# Patient Record
Sex: FEMALE | URBAN - NONMETROPOLITAN AREA
[De-identification: names, ages, dates, MRNs, and addresses within clinical notes are randomized per-mention and may not be internally consistent; named-entity substitution may affect disease eponyms.]

---

## 2018-07-16 ENCOUNTER — TELEPHONE (OUTPATIENT)
Dept: NEUROSURGERY | Age: 78
End: 2018-07-16

## 2018-07-16 NOTE — TELEPHONE ENCOUNTER
Neurosurgical pre apt questionnaire     Referring physician? GOSIA Fonseca    Who is completing questionnaire? Patient     Has the pt had any previous spinal/brain surgeries? NO    If yes, Where was the surgery preformed? What was the surgery? Who was the surgeon? When was this surgery? Why is the pt not following up with previous surgeon? Is this a second opinion? Was a MRI preformed? YES    If not, Is there any reason a MRI cannot be performed? Is there metal anywhere in the body? If yes,  Where was the MRI preformed? [facility]   Patient agreed to bring disk? YES   What part of the body? L Spine   When was it preformed? 6/13/18      Note:If  was not the facility that performed the study,    the disc will need to be brought appoint. Physical Therapy? YES - patient states is has helped. If yes, where was PT completed? Dr. Lacey Perry in [place]   What is the duration of therapy? Two days a week     Pain Management? NO   If yes, where was pain mgt therapy? Is the patient still under contract with pain mgt? Who is the pain mgt physician? Is the pt currently taking anything for pain control? Was prescribed Gabapentin but found she was allergic. Has not been prescribed anything for pain since. Employment Status ? Retired   What type of employment? Has the patient missed work due to pain? If unemployed, how long? Are you on disability? NO      Symptoms? Uncomfortable to lay on left side. Left leg pain and numb from knee to ankle.

## 2018-08-02 ENCOUNTER — OFFICE VISIT (OUTPATIENT)
Dept: NEUROSURGERY | Age: 78
End: 2018-08-02
Payer: MEDICARE

## 2018-08-02 VITALS
DIASTOLIC BLOOD PRESSURE: 78 MMHG | OXYGEN SATURATION: 99 % | HEART RATE: 74 BPM | WEIGHT: 153 LBS | SYSTOLIC BLOOD PRESSURE: 142 MMHG | HEIGHT: 65 IN | BODY MASS INDEX: 25.49 KG/M2

## 2018-08-02 DIAGNOSIS — M51.36 DDD (DEGENERATIVE DISC DISEASE), LUMBAR: ICD-10-CM

## 2018-08-02 DIAGNOSIS — R20.0 LEFT LEG NUMBNESS: ICD-10-CM

## 2018-08-02 DIAGNOSIS — M48.061 SPINAL STENOSIS OF LUMBAR REGION WITHOUT NEUROGENIC CLAUDICATION: ICD-10-CM

## 2018-08-02 DIAGNOSIS — M79.652 LEFT THIGH PAIN: Primary | ICD-10-CM

## 2018-08-02 DIAGNOSIS — M51.37 DDD (DEGENERATIVE DISC DISEASE), LUMBOSACRAL: ICD-10-CM

## 2018-08-02 PROCEDURE — 99204 OFFICE O/P NEW MOD 45 MIN: CPT | Performed by: NURSE PRACTITIONER

## 2018-08-02 RX ORDER — ZOLPIDEM TARTRATE 10 MG/1
TABLET ORAL
COMMUNITY

## 2018-08-02 RX ORDER — MONTELUKAST SODIUM 10 MG/1
TABLET ORAL
COMMUNITY

## 2018-08-02 RX ORDER — PRAVASTATIN SODIUM 20 MG
TABLET ORAL
Refills: 1 | COMMUNITY
Start: 2018-05-14

## 2018-08-02 RX ORDER — BENZONATATE 100 MG/1
CAPSULE ORAL
Refills: 0 | COMMUNITY
Start: 2018-05-10

## 2018-08-02 RX ORDER — LEVOTHYROXINE SODIUM 0.1 MG/1
TABLET ORAL
COMMUNITY

## 2018-08-02 ASSESSMENT — ENCOUNTER SYMPTOMS
BLOOD IN STOOL: 0
NAUSEA: 0
ABDOMINAL PAIN: 0
DIARRHEA: 0
CONSTIPATION: 1
BACK PAIN: 0
SINUS PAIN: 1
VOMITING: 0
EYES NEGATIVE: 1
STRIDOR: 0
HEARTBURN: 0
RESPIRATORY NEGATIVE: 1
SORE THROAT: 0

## 2018-08-02 NOTE — PROGRESS NOTES
[facility] Neurosurgery  Office Visit      Chief Complaint   Patient presents with    Numbness     inside left leg from knee down       HISTORY OF PRESENT ILLNESS:      Genesis Agustin is a 69 y.o. female retired who presents with a history of left anterior and medial thigh muscle cramping and pain that has improved since the initial onset. She has had a NCS/EMG on her lower extremities which possible nerve root injury L3-4 and L5-S1. She eventually developed erythema multiforme, she was placed on steroids which cleared the rash. Once she stopped the steroid the rash came back. She and her doctor came to the conclusion that she was allergic to the gabapentin. She states that the only exacerbating factor was when she would step up with the left leg. She reports that PT has greatly improved her pain. Her pain is not changed when going from a seated to standing position. Her pain is not changed with walking. Her pain is not changed when lying flat. Overall, indicative that the patient does not have a mechanical nature to their pain. The patient has underwent a non-operative treatment course that has included:  NSAIDs  Muscle Relaxers  Oral Steroids  Physical Therapy with core strengthening      Of note she does not use tobacco and does not take blood thinning medications. History reviewed. No pertinent past medical history. History reviewed. No pertinent surgical history.     Current Outpatient Prescriptions   Medication Sig Dispense Refill    benzonatate (TESSALON) 100 MG capsule TK 1 TO 2 CS PO TID PRN  0    levothyroxine (SYNTHROID) 100 MCG tablet levothyroxine 100 mcg tablet      metoprolol tartrate (LOPRESSOR) 25 MG tablet TK 1/2 T PO BID  1    montelukast (SINGULAIR) 10 MG tablet montelukast 10 mg tablet   prn      zolpidem (AMBIEN) 10 MG tablet zolpidem 10 mg tablet      pravastatin (PRAVACHOL) 20 MG tablet TK 1 T PO QPM  1     No current facility-administered medications for this visit. Allergies:  Gabapentin    Social History:   History   Smoking Status    Never Smoker   Smokeless Tobacco    Never Used     History   Alcohol use Not on file         Family History:   History reviewed. No pertinent family history. REVIEW OF SYSTEMS:  ROS   Constitutional: Negative. HENT: Positive for sinus pain. Negative for congestion, ear discharge, ear pain, hearing loss, nosebleeds, sore throat and tinnitus. Eyes: Negative. Respiratory: Negative. Negative for stridor. Cardiovascular: Negative. Gastrointestinal: Positive for constipation. Negative for abdominal pain, blood in stool, diarrhea, heartburn, melena, nausea and vomiting. Genitourinary: Positive for urgency. Negative for dysuria, flank pain, frequency and hematuria. Musculoskeletal: Positive for myalgias. Negative for back pain, falls, joint pain and neck pain. Skin: Negative. Neurological: Positive for tingling. Negative for dizziness, tremors, sensory change, speech change, focal weakness, seizures, loss of consciousness and headaches. Endo/Heme/Allergies: Negative. Psychiatric/Behavioral: Negative for depression, hallucinations, memory loss, substance abuse and suicidal ideas. The patient has insomnia. The patient is not nervous/anxious.         PHYSICAL EXAM:  Vitals:    08/02/18 1445   BP: (!) 142/78   Pulse:    SpO2:      Constitutional: appears well-developed and well-nourished.    Eyes  conjunctiva normal.  Pupils react to light  Ear, nose, throat -hearing intact to finger rub, No scars, masses, or lesions over external nose or ears, no atrophy of tongue  Neck-symmetric, no masses noted, no jugular vein distension  Respiration- chest wall appears symmetric, good expansion, normal effort without use of accessory muscles  Musculoskeletal  no significant wasting of muscles noted, no bony deformities, gait no gross ataxia  Extremities-no clubbing, cyanosis or edema  Skin  warm, dry, and